# Patient Record
Sex: MALE | URBAN - METROPOLITAN AREA
[De-identification: names, ages, dates, MRNs, and addresses within clinical notes are randomized per-mention and may not be internally consistent; named-entity substitution may affect disease eponyms.]

---

## 2024-07-18 ENCOUNTER — NURSE TRIAGE (OUTPATIENT)
Dept: CALL CENTER | Facility: HOSPITAL | Age: 24
End: 2024-07-18

## 2024-07-19 NOTE — TELEPHONE ENCOUNTER
"Reason for Disposition   [1] SEVERE vomiting (e.g., 6 or more times/day) AND [2] present > 8 hours (Exception: Patient sounds well, is drinking liquids, does not sound dehydrated, and vomiting has lasted less than 24 hours.)    Additional Information   Negative: Shock suspected (e.g., cold/pale/clammy skin, too weak to stand, low BP, rapid pulse)   Negative: Difficult to awaken or acting confused (e.g., disoriented, slurred speech)   Negative: Sounds like a life-threatening emergency to the triager   Negative: Vomiting occurs only while coughing   Negative: [1] Pregnant < 20 Weeks AND [2] nausea/vomiting began in early pregnancy (i.e., 4-8 weeks pregnant)   Negative: Chest pain   Negative: Headache is main symptom   Negative: Vomiting (or Nausea) in a cancer patient who is currently (or recently) receiving chemotherapy or radiation therapy, or cancer patient who has metastatic or end-stage cancer and is receiving palliative care   Negative: [1] Vomiting AND [2] contains red blood or black (\"coffee ground\") material  (Exception: Few red streaks in vomit that only happened once.)   Negative: Severe pain in one eye   Negative: Recent head injury (within last 3 days)   Negative: Recent abdominal injury (within last 3 days)   Negative: [1] Insulin-dependent diabetes (Type I) AND [2] glucose > 400 mg/dl (22 mmol/l)   Negative: [1] Vomiting AND [2] hernia is more painful or swollen than usual   Negative: [1] MODERATE vomiting (e.g., 3 - 5 times/day) AND [2] age > 60 years   Negative: Severe headache (e.g., excruciating)  (Exception: Similar to previous migraines.)   Negative: High-risk adult (e.g., diabetes mellitus, brain tumor, V-P shunt, hernia)   Negative: [1] Drinking very little AND [2] dehydration suspected (e.g., no urine > 12 hours, very dry mouth, very lightheaded)   Negative: Patient sounds very sick or weak to the triager   Negative: [1] Vomiting AND [2] abdomen looks much more swollen than usual   Negative: " "[1] Vomiting AND [2] contains bile (green color)   Negative: [1] Constant abdominal pain AND [2] present > 2 hours   Negative: [1] Fever > 103 F (39.4 C) AND [2] not able to get the fever down using Fever Care Advice   Negative: [1] Fever > 101 F (38.3 C) AND [2] age > 60 years   Negative: [1] Fever > 100.0 F (37.8 C) AND [2] bedridden (e.g., CVA, chronic illness, recovering from surgery)   Negative: [1] Fever > 100.0 F (37.8 C) AND [2] weak immune system (e.g., HIV positive, cancer chemo, splenectomy, organ transplant, chronic steroids)   Negative: Taking any of the following medications: digoxin (Lanoxin), lithium, theophylline, phenytoin (Dilantin)   Negative: [1] MILD or MODERATE vomiting AND [2] present > 48 hours (2 days) (Exception: Mild vomiting with associated diarrhea.)   Negative: Fever present > 3 days (72 hours)   Negative: Vomiting a prescription medication   Negative: [1] MILD vomiting with diarrhea AND [2] present > 5 days   Negative: Substance use (drug use) or unhealthy alcohol use, known or suspected   Negative: Vomiting is a chronic symptom (recurrent or ongoing AND present > 4 weeks)   Negative: [1] SEVERE vomiting (e.g., 6 or more times/day, vomits everything) BUT [2] hydrated   Negative: MILD or MODERATE vomiting (e.g., 1 - 5 times / day)   Negative: MILD vomiting with diarrhea   Negative: Possibly pregnant, questions about   Negative: Fever treatment, questions about    Answer Assessment - Initial Assessment Questions  1. VOMITING SEVERITY: \"How many times have you vomited in the past 24 hours?\"      - MILD:  1 - 2 times/day     - MODERATE: 3 - 5 times/day, decreased oral intake without significant weight loss or symptoms of dehydration     - SEVERE: 6 or more times/day, vomits everything or nearly everything, with significant weight loss, symptoms of dehydration       Severe every 10 min  2. ONSET: \"When did the vomiting begin?\"       Sick 4 days, vomiting today  3. FLUIDS: \"What fluids or " "food have you vomited up today?\" \"Have you been able to keep any fluids down?\"      Can't keep anything down  4. ABDOMEN PAIN: \"Are your having any abdomen pain?\" If Yes : \"How bad is it and what does it feel like?\" (e.g., crampy, dull, intermittent, constant)       yes  5. DIARRHEA: \"Is there any diarrhea?\" If Yes, ask: \"How many times today?\"       yes  6. CONTACTS: \"Is there anyone else in the family with the same symptoms?\"       possible  7. CAUSE: \"What do you think is causing your vomiting?\"     unsure  8. HYDRATION STATUS: \"Any signs of dehydration?\" (e.g., dry mouth [not only dry lips], too weak to stand) \"When did you last urinate?\"      Lips dry bluish  9. OTHER SYMPTOMS: \"Do you have any other symptoms?\" (e.g., fever, headache, vertigo, vomiting blood or coffee grounds, recent head injury)      Yes thought may have fever tested and was fine, Covid test neg  10. PREGNANCY: \"Is there any chance you are pregnant?\" \"When was your last menstrual period?\"      NA    Protocols used: Vomiting-ADULT-AH    "